# Patient Record
Sex: FEMALE | Race: BLACK OR AFRICAN AMERICAN | Employment: FULL TIME
[De-identification: names, ages, dates, MRNs, and addresses within clinical notes are randomized per-mention and may not be internally consistent; named-entity substitution may affect disease eponyms.]

---

## 2023-12-22 RX ORDER — TIZANIDINE 2 MG/1
2 TABLET ORAL EVERY 6 HOURS PRN
COMMUNITY
Start: 2023-12-08

## 2023-12-22 RX ORDER — DULOXETIN HYDROCHLORIDE 30 MG/1
CAPSULE, DELAYED RELEASE ORAL
COMMUNITY
Start: 2023-10-17

## 2023-12-22 RX ORDER — SPIRONOLACTONE 25 MG/1
TABLET ORAL
COMMUNITY
Start: 2023-11-13

## 2023-12-22 RX ORDER — HYDROCODONE BITARTRATE AND ACETAMINOPHEN 5; 325 MG/1; MG/1
TABLET ORAL
COMMUNITY
Start: 2023-11-09

## 2023-12-29 ENCOUNTER — HOSPITAL ENCOUNTER (OUTPATIENT)
Facility: HOSPITAL | Age: 48
End: 2023-12-29
Payer: COMMERCIAL

## 2023-12-29 ENCOUNTER — HOSPITAL ENCOUNTER (OUTPATIENT)
Facility: HOSPITAL | Age: 48
Discharge: HOME | End: 2023-12-29
Payer: COMMERCIAL

## 2023-12-29 VITALS — SYSTOLIC BLOOD PRESSURE: 122 MMHG | DIASTOLIC BLOOD PRESSURE: 80 MMHG | HEART RATE: 88 BPM | RESPIRATION RATE: 15 BRPM

## 2023-12-29 DIAGNOSIS — M54.50 LUMBAR PAIN: ICD-10-CM

## 2023-12-29 PROCEDURE — 2500000003 HC RX 250 WO HCPCS: Performed by: RADIOLOGY

## 2023-12-29 PROCEDURE — 62304 MYELOGRAPHY LUMBAR INJECTION: CPT

## 2023-12-29 PROCEDURE — 6360000004 HC RX CONTRAST MEDICATION: Performed by: RADIOLOGY

## 2023-12-29 PROCEDURE — 72132 CT LUMBAR SPINE W/DYE: CPT

## 2023-12-29 RX ORDER — LIDOCAINE HYDROCHLORIDE 10 MG/ML
10 INJECTION, SOLUTION EPIDURAL; INFILTRATION; INTRACAUDAL; PERINEURAL ONCE
Status: COMPLETED | OUTPATIENT
Start: 2023-12-29 | End: 2023-12-29

## 2023-12-29 RX ADMIN — LIDOCAINE HYDROCHLORIDE 5 ML: 10 INJECTION, SOLUTION EPIDURAL; INFILTRATION; INTRACAUDAL; PERINEURAL at 08:43

## 2023-12-29 RX ADMIN — IOHEXOL 17 ML: 180 INJECTION INTRAVENOUS at 08:43

## 2023-12-29 NOTE — DISCHARGE INSTRUCTIONS
Thank you for allowing me to care for you today. Your test results should be resulted in 3-5 days. If you would like to speak with the Radiology Nurse from 7 am to 4 pm please call [phone]. After hours please call the main Radiology dept at [phone] to page the nurse on call.       Thank you again,     Althea Purvis RN

## 2023-12-29 NOTE — PROGRESS NOTES
[id number] met patient in  from [facility] via stretcher, confirmed identity with two identifiers. Patient denies any complains of distress at this time. She reports bilateral strength in upper and lower extremities, she reports equal bilateral feeling in both upper and lower extremity. Reviewed post Myelogram instructions, offered patient water and snack. Patient remains with HOB at 30 degrees. [id number] Discharge instructions reviewed and gave paper copy,  patient transported to [facility] [place] for  to drive home.     Aleksandra Sánchez RN

## 2025-07-15 ENCOUNTER — TRANSCRIBE ORDERS (OUTPATIENT)
Facility: HOSPITAL | Age: 50
End: 2025-07-15

## 2025-07-15 DIAGNOSIS — M54.16 LUMBAR RADICULOPATHY: Primary | ICD-10-CM

## 2025-07-15 DIAGNOSIS — M51.26 DISPLACEMENT OF LUMBAR INTERVERTEBRAL DISC WITHOUT MYELOPATHY: ICD-10-CM

## 2025-08-18 ENCOUNTER — HOSPITAL ENCOUNTER (OUTPATIENT)
Facility: HOSPITAL | Age: 50
Discharge: HOME | End: 2025-08-21
Attending: PHYSICAL MEDICINE & REHABILITATION
Payer: COMMERCIAL

## 2025-08-18 DIAGNOSIS — M51.26 DISPLACEMENT OF LUMBAR INTERVERTEBRAL DISC WITHOUT MYELOPATHY: ICD-10-CM

## 2025-08-18 DIAGNOSIS — M54.16 LUMBAR RADICULOPATHY: ICD-10-CM

## 2025-08-18 PROCEDURE — 77003 FLUOROGUIDE FOR SPINE INJECT: CPT

## 2025-08-18 PROCEDURE — 6360000004 HC RX CONTRAST MEDICATION

## 2025-08-18 PROCEDURE — 72132 CT LUMBAR SPINE W/DYE: CPT

## 2025-08-18 RX ADMIN — IOHEXOL 10 ML: 180 INJECTION INTRAVENOUS at 08:34
